# Patient Record
Sex: MALE | Race: WHITE | NOT HISPANIC OR LATINO | Employment: FULL TIME | URBAN - NONMETROPOLITAN AREA
[De-identification: names, ages, dates, MRNs, and addresses within clinical notes are randomized per-mention and may not be internally consistent; named-entity substitution may affect disease eponyms.]

---
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
## 2020-03-11 ENCOUNTER — HEALTH MAINTENANCE LETTER (OUTPATIENT)
Age: 20
End: 2020-03-11

## 2020-09-17 ENCOUNTER — OFFICE VISIT (OUTPATIENT)
Dept: FAMILY MEDICINE | Facility: OTHER | Age: 20
End: 2020-09-17
Attending: PHYSICIAN ASSISTANT

## 2020-09-17 VITALS
WEIGHT: 220 LBS | RESPIRATION RATE: 20 BRPM | BODY MASS INDEX: 30.68 KG/M2 | SYSTOLIC BLOOD PRESSURE: 130 MMHG | TEMPERATURE: 97.5 F | HEART RATE: 60 BPM | DIASTOLIC BLOOD PRESSURE: 80 MMHG

## 2020-09-17 DIAGNOSIS — R41.840 DECREASED ATTENTION SPAN: Primary | ICD-10-CM

## 2020-09-17 DIAGNOSIS — R41.840 CONCENTRATION DEFICIT: ICD-10-CM

## 2020-09-17 PROCEDURE — 99213 OFFICE O/P EST LOW 20 MIN: CPT | Performed by: PHYSICIAN ASSISTANT

## 2020-09-17 SDOH — HEALTH STABILITY: MENTAL HEALTH: HOW OFTEN DO YOU HAVE A DRINK CONTAINING ALCOHOL?: NEVER

## 2020-09-17 NOTE — PROGRESS NOTES
Nursing Notes:   No Marino LPN  9/17/2020  1:32 PM  Signed  Chief Complaint   Patient presents with     A.COLTONHCHAPO         Medication Reconciliation: complete    No Marino LPN      HPI:    Julius An is a 20 year old male who presents for ADHD concerns.  Patient states that his [employer] at work has noticed concerns about his attention and focus.  They are wondering if he has ADD.  He gets off of tasks easily.  He has hard time focusing and staying on task.  He has had the symptoms over the last 2 to 3 years.  Father may have had ADHD.  Unsure if he was previously tested.  No hyperactivity concerns.  No depression or anxiety concerns.  No suicidal homicidal ideation.    Past Medical History:   Diagnosis Date     Condition influencing health status     No Comments Provided       Past Surgical History:   Procedure Laterality Date     OTHER SURGICAL HISTORY      [id number],NO PREVIOUS SURGERY,Past Surgical History is unremarkable       History reviewed. No pertinent family history.    Social History     Tobacco Use     Smoking status: Current Every Day Smoker     Packs/day: 0.50     Types: Cigarettes     Start date: 05/2018     Smokeless tobacco: Never Used   Substance Use Topics     Alcohol use: No     Frequency: Never       No current outpatient medications on file.       No Known Allergies    REVIEW OFSYSTEMS:  Refer to HPI.    EXAM:   Vitals:    /80 (BP Location: Right arm, Patient Position: Sitting, Cuff Size: Adult Regular)   Pulse 60   Temp 97.5  F (36.4  C)   Resp 20   Wt 99.8 kg (220 lb)   BMI 30.68 kg/m    General appearance:appropriately dressed and well groomed  Attitude: cooperative  Behavior: normal  Eye Contact: normal  Speech: normal  Orientation: oriented to person, place, time and situation  Mood:  admits no sadness and anxiety  Affect: Mood Congruent  Thought Process: clear  Suicidal or Homicidal Ideation: reports no thoughts or intentions  Hallucination:  no    PHQ Depression Screen  No flowsheet data found.    CLYDE Anxiety Screen  No flowsheet data found.    ASSESSMENT AND PLAN:      ICD-10-CM    1. Decreased attention Span  R41.840 MENTAL HEALTH REFERRAL  - Adult; Assessments and Testing; ADHD; Other[facility] [phone]; We will contact you to schedule the appointment or please call with any questions   2. Concentration deficit  R41.840 MENTAL HEALTH REFERRAL  - Adult; Assessments and Testing; ADHD; Other: [facility] [phone]; We will contact you to schedule the appointment or please call with any questions         Completed mental health referral for ADHD testing.  Discussed symptoms at length.  Return as needed for recheck.  Discussed medication options depending on the results of the ADHD testing.    Reviewed risks and benefits of medications and other treatment options.   Pt is advised to call if side effects to medications occur, especially if exaggerated/dramatic.    Maddy Alvarez PA-C PA-C..................9/17/2020 1:21 PM

## 2020-09-17 NOTE — NURSING NOTE
Chief Complaint   Patient presents with     A.D.H.D         Medication Reconciliation: complete    No Marnio LPN

## 2020-10-18 ENCOUNTER — HOSPITAL ENCOUNTER (EMERGENCY)
Facility: OTHER | Age: 20
Discharge: HOME | End: 2020-10-18
Attending: FAMILY MEDICINE | Admitting: FAMILY MEDICINE

## 2020-10-18 VITALS
WEIGHT: 230 LBS | SYSTOLIC BLOOD PRESSURE: 122 MMHG | HEART RATE: 55 BPM | TEMPERATURE: 98.4 F | DIASTOLIC BLOOD PRESSURE: 76 MMHG | OXYGEN SATURATION: 82 % | RESPIRATION RATE: 18 BRPM | BODY MASS INDEX: 32.08 KG/M2

## 2020-10-18 DIAGNOSIS — L23.7 CONTACT DERMATITIS DUE TO POISON IVY: ICD-10-CM

## 2020-10-18 DIAGNOSIS — L03.119 CELLULITIS OF UPPER EXTREMITY, UNSPECIFIED LATERALITY: ICD-10-CM

## 2020-10-18 DIAGNOSIS — Z20.2 STD EXPOSURE: ICD-10-CM

## 2020-10-18 PROCEDURE — 250N000013 HC RX MED GY IP 250 OP 250 PS 637: Performed by: FAMILY MEDICINE

## 2020-10-18 PROCEDURE — 96372 THER/PROPH/DIAG INJ SC/IM: CPT | Performed by: FAMILY MEDICINE

## 2020-10-18 PROCEDURE — 87491 CHLMYD TRACH DNA AMP PROBE: CPT | Performed by: FAMILY MEDICINE

## 2020-10-18 PROCEDURE — 250N000011 HC RX IP 250 OP 636: Performed by: FAMILY MEDICINE

## 2020-10-18 PROCEDURE — 99283 EMERGENCY DEPT VISIT LOW MDM: CPT | Performed by: FAMILY MEDICINE

## 2020-10-18 PROCEDURE — 87591 N.GONORRHOEAE DNA AMP PROB: CPT | Performed by: FAMILY MEDICINE

## 2020-10-18 PROCEDURE — 99284 EMERGENCY DEPT VISIT MOD MDM: CPT | Performed by: FAMILY MEDICINE

## 2020-10-18 PROCEDURE — 81003 URINALYSIS AUTO W/O SCOPE: CPT | Performed by: FAMILY MEDICINE

## 2020-10-18 RX ORDER — CEFTRIAXONE SODIUM 1 G
1 VIAL (EA) INJECTION ONCE
Status: COMPLETED | OUTPATIENT
Start: 2020-10-18 | End: 2020-10-18

## 2020-10-18 RX ORDER — DEXAMETHASONE SODIUM PHOSPHATE 10 MG/ML
10 INJECTION, SOLUTION INTRAMUSCULAR; INTRAVENOUS ONCE
Status: COMPLETED | OUTPATIENT
Start: 2020-10-18 | End: 2020-10-18

## 2020-10-18 RX ORDER — AZITHROMYCIN 250 MG/1
1000 TABLET, FILM COATED ORAL ONCE
Status: COMPLETED | OUTPATIENT
Start: 2020-10-18 | End: 2020-10-18

## 2020-10-18 RX ORDER — CEPHALEXIN 500 MG/1
500 CAPSULE ORAL 4 TIMES DAILY
Qty: 40 CAPSULE | Refills: 0 | Status: SHIPPED | OUTPATIENT
Start: 2020-10-18 | End: 2020-10-28

## 2020-10-18 RX ORDER — PREDNISONE 20 MG/1
60 TABLET ORAL DAILY
Qty: 12 TABLET | Refills: 0 | Status: SHIPPED | OUTPATIENT
Start: 2020-10-18 | End: 2020-10-18

## 2020-10-18 RX ORDER — PREDNISONE 20 MG/1
60 TABLET ORAL DAILY
Qty: 12 TABLET | Refills: 0 | Status: SHIPPED | OUTPATIENT
Start: 2020-10-18

## 2020-10-18 RX ADMIN — AZITHROMYCIN MONOHYDRATE 1000 MG: 250 TABLET ORAL at 22:30

## 2020-10-18 RX ADMIN — CEFTRIAXONE SODIUM 1 G: 1 INJECTION, POWDER, FOR SOLUTION INTRAMUSCULAR; INTRAVENOUS at 22:30

## 2020-10-18 RX ADMIN — DEXAMETHASONE SODIUM PHOSPHATE 10 MG: 10 INJECTION INTRAMUSCULAR; INTRAVENOUS at 22:29

## 2020-10-18 ASSESSMENT — ENCOUNTER SYMPTOMS
DIARRHEA: 0
SORE THROAT: 0
COUGH: 0
MYALGIAS: 0
DYSURIA: 0
WEAKNESS: 0
BACK PAIN: 0
VOMITING: 0
FLANK PAIN: 0
NAUSEA: 0
SHORTNESS OF BREATH: 0
EYE ITCHING: 0
FEVER: 0
ABDOMINAL PAIN: 0

## 2020-10-18 NOTE — ED AVS SNAPSHOT
[facility]  [address]  [place] [address]  Phone: [phone]  Fax: [phone]                                    Julius An   MRN: [id number]    Department: [facility]   Date of Visit: 10/18/2020           After Visit Summary Signature Page    I have received my discharge instructions, and my questions have been answered. I have discussed any challenges I see with this plan with the nurse or doctor.    ..........................................................................................................................................  Patient/Patient Representative Signature      ..........................................................................................................................................  Patient Representative Print Name and Relationship to Patient    ..................................................               ................................................  Date                                   Time    ..........................................................................................................................................  Reviewed by Signature/Title    ...................................................              ..............................................  Date                                               Time          22EPIC Rev 08/18

## 2020-10-19 LAB
ALBUMIN UR-MCNC: NEGATIVE MG/DL
APPEARANCE UR: CLEAR
BILIRUB UR QL STRIP: NEGATIVE
C TRACH DNA SPEC QL NAA+PROBE: NOT DETECTED
COLOR UR AUTO: NORMAL
GLUCOSE UR STRIP-MCNC: NEGATIVE MG/DL
HGB UR QL STRIP: NEGATIVE
KETONES UR STRIP-MCNC: NEGATIVE MG/DL
LEUKOCYTE ESTERASE UR QL STRIP: NEGATIVE
N GONORRHOEA DNA SPEC QL NAA+PROBE: NOT DETECTED
NITRATE UR QL: NEGATIVE
PH UR STRIP: 6.5 PH (ref 5–7)
SOURCE: NORMAL
SP GR UR STRIP: 1.02 (ref 1–1.03)
SPECIMEN SOURCE: NORMAL
UROBILINOGEN UR STRIP-MCNC: NORMAL MG/DL (ref 0–2)

## 2020-10-19 NOTE — ED NOTES
Pt feeling jittery after IM steroid and antibiotic. Pt stated he felt faint in bathroom. P-63. SP02 100% on R/A R-20. B/P 129/93. Jael Clemente RN .............. 10/18/2020  10:43 PM

## 2020-10-19 NOTE — ED TRIAGE NOTES
Pt presents with a rash on both forearms that started on Wednesday and has gradually been getting worse (covering most of posterior forearms). Rash is itchy and blistering, crusted, some swelling. Pain rated at 91/0. Pt stated in the past he had a small spot but it cleared up with creme that was prescription. Denies SOB. /64   Pulse 94   Temp 98.4  F (36.9  C)   Resp 18   Wt 104.3 kg (230 lb)   SpO2 99%   BMI 32.08 kg/m  '  Peggy Maier RN on 10/18/2020 at 9:29 PM

## 2020-10-19 NOTE — ED PROVIDER NOTES
History     Chief Complaint   Patient presents with     Rash     HPI  Julius An is a 20 year old male who presents to the emergency room complaining of a rash on both of his forearms.  He states the rash started approximately 1 week ago and has progressively worsened.  He states the rash is itchy and has some vesicles present that he scratched which made the rash spread.  He states now there is some crusting that is sort of yellowish.  He states that the crusting started a couple of days ago.  He states that his forearms feel swollen and are now starting to hurt.  He has not taken any ibuprofen or Tylenol.  He states he had a similar rash about a year ago and used an over-the-counter cream topically which made it go away but it did not work this time.  He states that he does work outside and may have poison ivy.    No fever, chills, sore throat, cough, chest pain, shortness of breath, abdominal pain, nausea, vomiting, diarrhea, dysuria, back or flank pain.  The patient states that his girlfriend told him that she had chlamydia and he would like to be tested and treated today.  He denies any penile discharge or penile lesions.    Allergies:  No Known Allergies    Problem List:    There are no active problems to display for this patient.       Past Medical History:    Past Medical History:   Diagnosis Date     Condition influencing health status        Past Surgical History:    Past Surgical History:   Procedure Laterality Date     OTHER SURGICAL HISTORY      [id number],NO PREVIOUS SURGERY,Past Surgical History is unremarkable       Family History:    No family history on file.    Social History:  Marital Status:  Single [1]  Social History     Tobacco Use     Smoking status: Current Every Day Smoker     Packs/day: 0.50     Types: Cigarettes     Start date: 05/2018     Smokeless tobacco: Never Used   Substance Use Topics     Alcohol use: No     Frequency: Never     Drug use: Never     Comment: Drug use: No         Medications:         cephALEXin (KEFLEX) 500 MG capsule       predniSONE (DELTASONE) 20 MG tablet          Review of Systems   Constitutional: Negative for fever.   HENT: Negative for sore throat.    Eyes: Negative for itching.   Respiratory: Negative for cough and shortness of breath.    Cardiovascular: Negative for chest pain.   Gastrointestinal: Negative for abdominal pain, diarrhea, nausea and vomiting.   Genitourinary: Negative for dysuria, flank pain, penile pain, penile swelling, scrotal swelling and testicular pain.   Musculoskeletal: Negative for back pain and myalgias.   Skin: Positive for rash.   Neurological: Negative for weakness.   All other systems reviewed and are negative.      Physical Exam   BP: 138/64  Pulse: 94  Temp: 98.4  F (36.9  C)  Resp: 18  Weight: 104.3 kg (230 lb)  SpO2: 99 %      Physical Exam  Vitals signs and nursing note reviewed.   Constitutional:       General: He is not in acute distress.     Appearance: Normal appearance. He is well-developed and overweight. He is not ill-appearing.   HENT:      Head: Normocephalic and atraumatic.      Right Ear: External ear normal.      Left Ear: External ear normal.      Nose: Nose normal.      Mouth/Throat:      Lips: Pink.      Mouth: Mucous membranes are moist.      Pharynx: Oropharynx is clear.   Eyes:      Extraocular Movements: Extraocular movements intact.      Conjunctiva/sclera: Conjunctivae normal.      Pupils: Pupils are equal, round, and reactive to light.   Neck:      Musculoskeletal: Full passive range of motion without pain, normal range of motion and neck supple.      Thyroid: No thyromegaly.   Cardiovascular:      Rate and Rhythm: Normal rate and regular rhythm.      Pulses: Normal pulses.      Heart sounds: Normal heart sounds, S1 normal and S2 normal. No murmur.   Pulmonary:      Effort: Pulmonary effort is normal.      Breath sounds: Normal breath sounds. No decreased breath sounds, wheezing, rhonchi or rales.    Abdominal:      General: Bowel sounds are normal.      Palpations: Abdomen is soft.      Tenderness: There is no abdominal tenderness.   Musculoskeletal: Normal range of motion.   Lymphadenopathy:      Cervical: No cervical adenopathy.   Skin:     General: Skin is warm.      Capillary Refill: Capillary refill takes less than 2 seconds.      Findings: Erythema and rash present. Rash is crusting and vesicular.      Comments: There is a an underlying rash present on both forearms and wrist that is erythematous with excoriated vesicles present.  There is crusting over some areas and underlying warmth, swelling and tenderness.  Findings are consistent with poison ivy that has become secondarily infected.   Neurological:      Mental Status: He is alert and oriented to person, place, and time.   Psychiatric:         Mood and Affect: Mood normal.         Behavior: Behavior normal. Behavior is cooperative.         ED Course     Procedures  Critical Care time:  none  No results found for this or any previous visit (from the past 24 hour(s)).    Medications   dexamethasone PF (DECADRON) injection 10 mg (10 mg Intramuscular Given 10/18/20 2229)   cefTRIAXone (ROCEPHIN) injection 1 g (1 g Intramuscular Given 10/18/20 2230)   azithromycin (ZITHROMAX) tablet 1,000 mg (1,000 mg Oral Given 10/18/20 2230)       Assessments & Plan (with Medical Decision Making)     I have reviewed the nursing notes.    The patient is treated empirically with Rocephin and Zithromax to treat both the STD exposure as well as his infected dermatitis.    Patient is discharged home in good condition.  He will be continued on prednisone 60 mg daily for 4 more days and Keflex for 10 days.  He may use ibuprofen and Tylenol as needed for discomfort.  I have reviewed the findings, diagnosis, plan and need for follow up with the patient.    New Prescriptions    CEPHALEXIN (KEFLEX) 500 MG CAPSULE    Take 1 capsule (500 mg) by mouth 4 times daily for 10 days     PREDNISONE (DELTASONE) 20 MG TABLET    Take 3 tablets (60 mg) by mouth daily       Final diagnoses:   Contact dermatitis due to poison ivy   STD exposure   Cellulitis of upper extremity, unspecified laterality       10/18/2020   [facility]     Severo Alfaro MD  10/18/20 9233

## 2020-10-19 NOTE — RESULT ENCOUNTER NOTE
Final result for both N. Gonorrhoeae PCR and Chlamydia Trachomatis PCR are NEGATIVE.  No treatment or change in treatment per [facility] ED Lab Result protocol.

## 2020-11-09 ENCOUNTER — ALLIED HEALTH/NURSE VISIT (OUTPATIENT)
Dept: FAMILY MEDICINE | Facility: OTHER | Age: 20
End: 2020-11-09
Payer: OTHER GOVERNMENT

## 2020-11-09 DIAGNOSIS — R05.9 COUGH: Primary | ICD-10-CM

## 2020-11-09 PROCEDURE — 99207 PR NO CHARGE NURSE ONLY: CPT

## 2020-11-09 PROCEDURE — U0003 INFECTIOUS AGENT DETECTION BY NUCLEIC ACID (DNA OR RNA); SEVERE ACUTE RESPIRATORY SYNDROME CORONAVIRUS 2 (SARS-COV-2) (CORONAVIRUS DISEASE [COVID-19]), AMPLIFIED PROBE TECHNIQUE, MAKING USE OF HIGH THROUGHPUT TECHNOLOGIES AS DESCRIBED BY CMS-2020-01-R: HCPCS | Mod: ZL

## 2020-11-09 PROCEDURE — C9803 HOPD COVID-19 SPEC COLLECT: HCPCS

## 2020-11-10 LAB
SARS-COV-2 RNA SPEC QL NAA+PROBE: ABNORMAL
SPECIMEN SOURCE: ABNORMAL

## 2024-12-16 ENCOUNTER — HOSPITAL ENCOUNTER (EMERGENCY)
Facility: OTHER | Age: 24
Discharge: HOME | End: 2024-12-16
Attending: PHYSICIAN ASSISTANT
Payer: MEDICAID

## 2024-12-16 VITALS
OXYGEN SATURATION: 99 % | WEIGHT: 230 LBS | HEIGHT: 71 IN | BODY MASS INDEX: 32.2 KG/M2 | RESPIRATION RATE: 18 BRPM | SYSTOLIC BLOOD PRESSURE: 145 MMHG | HEART RATE: 101 BPM | DIASTOLIC BLOOD PRESSURE: 98 MMHG | TEMPERATURE: 97.8 F

## 2024-12-16 DIAGNOSIS — R42 LIGHTHEADEDNESS: ICD-10-CM

## 2024-12-16 PROCEDURE — 99282 EMERGENCY DEPT VISIT SF MDM: CPT | Performed by: PHYSICIAN ASSISTANT

## 2024-12-16 PROCEDURE — 99281 EMR DPT VST MAYX REQ PHY/QHP: CPT | Performed by: PHYSICIAN ASSISTANT

## 2024-12-16 ASSESSMENT — COLUMBIA-SUICIDE SEVERITY RATING SCALE - C-SSRS
1. IN THE PAST MONTH, HAVE YOU WISHED YOU WERE DEAD OR WISHED YOU COULD GO TO SLEEP AND NOT WAKE UP?: NO
6. HAVE YOU EVER DONE ANYTHING, STARTED TO DO ANYTHING, OR PREPARED TO DO ANYTHING TO END YOUR LIFE?: NO
2. HAVE YOU ACTUALLY HAD ANY THOUGHTS OF KILLING YOURSELF IN THE PAST MONTH?: NO

## 2024-12-16 NOTE — ED PROVIDER NOTES
"  History     Chief Complaint   Patient presents with     Shoulder Pain     HPI  Julius An is a 24 year old male who presents to the ED for evaluation of shoulder pain. Pt c/o 1 week of bilateral shoulder pain worse in the L side. Pt reports he thought that this was because he has been snowmobiling lately. A few days ago pt noticed that there were linear scars on both shoulders, he reports the L shoulder looks more \"fresh\". Pt also has document from dentist that he believes states he has a \"blood pressure implant\".    Allergies:  No Known Allergies    Problem List:    There are no active problems to display for this patient.       Past Medical History:    Past Medical History:   Diagnosis Date     Condition influencing health status        Past Surgical History:    Past Surgical History:   Procedure Laterality Date     OTHER SURGICAL HISTORY      [id number],NO PREVIOUS SURGERY,Past Surgical History is unremarkable       Family History:    No family history on file.    Social History:  Marital Status:  Single [1]  Social History     Tobacco Use     Smoking status: Every Day     Current packs/day: 0.50     Average packs/day: 0.5 packs/day for 6.6 years (3.3 ttl pk-yrs)     Types: Cigarettes     Start date: 05/2018     Smokeless tobacco: Never   Substance Use Topics     Alcohol use: No     Drug use: Never     Comment: Drug use: No        Medications:    predniSONE (DELTASONE) 20 MG tablet          Review of Systems    Physical Exam   BP: (!) 145/98  Pulse: 101  Temp: 97.8  F (36.6  C)  Resp: 18  Height: 180.3 cm (5' 11\")  Weight: 104.3 kg (230 lb)  SpO2: 99 %      Physical Exam    ED Course        Procedures         {EKG done?:485227}    Critical Care time:  {none or minutes:404548}  {Trauma Activation or Fall?:837082}  {Sepsis/Septic Shock/Stemi/Stroke:188650}         No results found for this or any previous visit (from the past 24 hours).    Medications - No data to display    Assessments & Plan (with Medical " "Decision Making)     I have reviewed the nursing notes.    I have reviewed the findings, diagnosis, plan and need for follow up with the patient.  {ED Addendum:277293::\" \"}        Medical Decision Making  The patient's presentation was of {[facility] Problem:366184}.    The patient's evaluation involved:  {[facility] Data:273705}    The patient's management necessitated {[facility] Management:597590}.        Current Discharge Medication List          Final diagnoses:   Lightheadedness       12/16/2024   [facility]    " for follow up with the patient.        Discharge Medication List as of 12/16/2024  4:49 PM          Final diagnoses:   Lightheadedness       12/16/2024   [facility] AND [facility]       Devon Quiles PA  12/21/24 0808

## 2024-12-16 NOTE — ED TRIAGE NOTES
"Pt arrives to ED alone via private vehicle. Pt c/o 1 week of bilateral shoulder pain worse in the L side. Pt reports he thought that this was because he has been snowmobiling lately. A few days ago pt noticed that there were linear scars on both shoulders, he reports the L shoulder looks more \"fresh\". Pt also has document from dentist that he believes states he has a \"blood pressure implant\" writer cannot find any mention of implant in documents provided by pt. Pulse 101   Temp 97.8  F (36.6  C) (Tympanic)   Resp 18   Ht 1.803 m (5' 11\")   Wt 104.3 kg (230 lb)   SpO2 99%   BMI 32.08 kg/m         Triage Assessment (Adult)       Row Name 12/16/24 9702          Triage Assessment    Airway WDL WDL        Respiratory WDL    Respiratory WDL WDL        Skin Circulation/Temperature WDL    Skin Circulation/Temperature WDL X  pt reports incisions on both shoulders        Cardiac WDL    Cardiac WDL WDL        Peripheral/Neurovascular WDL    Peripheral Neurovascular WDL WDL        Cognitive/Neuro/Behavioral WDL    Cognitive/Neuro/Behavioral WDL WDL                     "

## 2024-12-16 NOTE — ED NOTES
"Pt states that he is here because the dentist took his blood pressure on the left arm and he is concerned about a line across his left shoulder. He states there is a line on his shoulder like an \"incision\". No line visualized on shoulder. Back is unremarkable.   "

## 2024-12-16 NOTE — DISCHARGE INSTRUCTIONS
As discussed, physical exam appeared well.  Did discuss we did discuss obtaining further studies today and continue with follow-up appointment with PCP.  He did decline at this time.  Please return if there are worsening or concerning symptoms.

## 2024-12-21 ASSESSMENT — ENCOUNTER SYMPTOMS
FEVER: 0
SHORTNESS OF BREATH: 0
COUGH: 0
CHILLS: 0
LIGHT-HEADEDNESS: 1
ABDOMINAL PAIN: 0

## 2024-12-22 ENCOUNTER — HOSPITAL ENCOUNTER (EMERGENCY)
Facility: OTHER | Age: 24
Discharge: HOME | End: 2024-12-22
Attending: STUDENT IN AN ORGANIZED HEALTH CARE EDUCATION/TRAINING PROGRAM
Payer: MEDICAID

## 2024-12-22 VITALS
HEART RATE: 105 BPM | DIASTOLIC BLOOD PRESSURE: 89 MMHG | OXYGEN SATURATION: 100 % | RESPIRATION RATE: 24 BRPM | TEMPERATURE: 100.2 F | SYSTOLIC BLOOD PRESSURE: 132 MMHG

## 2024-12-22 DIAGNOSIS — K08.89 PAIN, DENTAL: ICD-10-CM

## 2024-12-22 DIAGNOSIS — J10.1 INFLUENZA A: ICD-10-CM

## 2024-12-22 LAB
BASOPHILS # BLD AUTO: 0 10E3/UL (ref 0–0.2)
BASOPHILS NFR BLD AUTO: 0 %
EOSINOPHIL # BLD AUTO: 0.1 10E3/UL (ref 0–0.7)
EOSINOPHIL NFR BLD AUTO: 1 %
ERYTHROCYTE [DISTWIDTH] IN BLOOD BY AUTOMATED COUNT: 12.6 % (ref 10–15)
FLUAV RNA SPEC QL NAA+PROBE: POSITIVE
FLUBV RNA RESP QL NAA+PROBE: NEGATIVE
HCT VFR BLD AUTO: 43.3 % (ref 40–53)
HGB BLD-MCNC: 15.1 G/DL (ref 13.3–17.7)
HOLD SPECIMEN: NORMAL
IMM GRANULOCYTES # BLD: 0 10E3/UL
IMM GRANULOCYTES NFR BLD: 0 %
LYMPHOCYTES # BLD AUTO: 1.7 10E3/UL (ref 0.8–5.3)
LYMPHOCYTES NFR BLD AUTO: 17 %
MCH RBC QN AUTO: 29.2 PG (ref 26.5–33)
MCHC RBC AUTO-ENTMCNC: 34.9 G/DL (ref 31.5–36.5)
MCV RBC AUTO: 84 FL (ref 78–100)
MONOCYTES # BLD AUTO: 1.6 10E3/UL (ref 0–1.3)
MONOCYTES NFR BLD AUTO: 16 %
NEUTROPHILS # BLD AUTO: 6.7 10E3/UL (ref 1.6–8.3)
NEUTROPHILS NFR BLD AUTO: 66 %
NRBC # BLD AUTO: 0 10E3/UL
NRBC BLD AUTO-RTO: 0 /100
PLATELET # BLD AUTO: 300 10E3/UL (ref 150–450)
RBC # BLD AUTO: 5.18 10E6/UL (ref 4.4–5.9)
RSV RNA SPEC NAA+PROBE: NEGATIVE
SARS-COV-2 RNA RESP QL NAA+PROBE: NEGATIVE
WBC # BLD AUTO: 10.2 10E3/UL (ref 4–11)

## 2024-12-22 PROCEDURE — 99284 EMERGENCY DEPT VISIT MOD MDM: CPT | Performed by: STUDENT IN AN ORGANIZED HEALTH CARE EDUCATION/TRAINING PROGRAM

## 2024-12-22 PROCEDURE — 87637 SARSCOV2&INF A&B&RSV AMP PRB: CPT | Performed by: STUDENT IN AN ORGANIZED HEALTH CARE EDUCATION/TRAINING PROGRAM

## 2024-12-22 PROCEDURE — 250N000011 HC RX IP 250 OP 636: Performed by: STUDENT IN AN ORGANIZED HEALTH CARE EDUCATION/TRAINING PROGRAM

## 2024-12-22 PROCEDURE — 96372 THER/PROPH/DIAG INJ SC/IM: CPT | Performed by: STUDENT IN AN ORGANIZED HEALTH CARE EDUCATION/TRAINING PROGRAM

## 2024-12-22 PROCEDURE — 85025 COMPLETE CBC W/AUTO DIFF WBC: CPT | Performed by: STUDENT IN AN ORGANIZED HEALTH CARE EDUCATION/TRAINING PROGRAM

## 2024-12-22 PROCEDURE — 36415 COLL VENOUS BLD VENIPUNCTURE: CPT | Performed by: STUDENT IN AN ORGANIZED HEALTH CARE EDUCATION/TRAINING PROGRAM

## 2024-12-22 PROCEDURE — 99283 EMERGENCY DEPT VISIT LOW MDM: CPT | Performed by: STUDENT IN AN ORGANIZED HEALTH CARE EDUCATION/TRAINING PROGRAM

## 2024-12-22 RX ORDER — KETOROLAC TROMETHAMINE 30 MG/ML
30 INJECTION, SOLUTION INTRAMUSCULAR; INTRAVENOUS ONCE
Status: COMPLETED | OUTPATIENT
Start: 2024-12-22 | End: 2024-12-22

## 2024-12-22 RX ADMIN — KETOROLAC TROMETHAMINE 30 MG: 30 INJECTION, SOLUTION INTRAMUSCULAR at 20:07

## 2024-12-22 ASSESSMENT — COLUMBIA-SUICIDE SEVERITY RATING SCALE - C-SSRS
2. HAVE YOU ACTUALLY HAD ANY THOUGHTS OF KILLING YOURSELF IN THE PAST MONTH?: NO
6. HAVE YOU EVER DONE ANYTHING, STARTED TO DO ANYTHING, OR PREPARED TO DO ANYTHING TO END YOUR LIFE?: NO
1. IN THE PAST MONTH, HAVE YOU WISHED YOU WERE DEAD OR WISHED YOU COULD GO TO SLEEP AND NOT WAKE UP?: NO

## 2024-12-22 ASSESSMENT — ACTIVITIES OF DAILY LIVING (ADL): ADLS_ACUITY_SCORE: 41

## 2024-12-23 NOTE — DISCHARGE INSTRUCTIONS
You tested positive for the flu. Expect symptoms to improve after several days. If you reconsider and would like to get the flu antiviral treatment, please call [facility] and ask for the emergency department.    Recommend using ibuprofen and/or Tylenol for fever and body aches. Suspect once you are over the flu your jaw pain will feel better. If your tooth/jaw still hurts after the flu - please follow up with a dentist.    Your blood test was reassuring for unlikely bacterial infection.    Recommend setting up a primary care provider appointment on your way out at the ER registration desk for further follow up.    Please review attached instructions including reasons to return to the emergency department.

## 2024-12-23 NOTE — ED PROVIDER NOTES
History     Chief Complaint   Patient presents with    Fever    Headache    Dental Pain       Julius An is a 24 year old male who presents with viral type symptoms.  Acute onset 3 AM this morning.  He has a fever, headache, fatigue, myalgias, and upper left tooth pain.  He feels there is pain throughout his entire left jaw but particularly to his approximately #15 which is mostly absent but he states still there is a part of it intact.  Drainage from this area.  He is concerned he may have a bacterial infection from contaminated water at home.  Denies any vomiting or diarrhea or dyspnea.     No Known Allergies    There are no active problems to display for this patient.      Past Medical History:   Diagnosis Date    Condition influencing health status        Past Surgical History:   Procedure Laterality Date    OTHER SURGICAL HISTORY      [id number],NO PREVIOUS SURGERY,Past Surgical History is unremarkable       No family history on file.    Social History     Tobacco Use    Smoking status: Every Day     Current packs/day: 0.50     Average packs/day: 0.5 packs/day for 6.6 years (3.3 ttl pk-yrs)     Types: Cigarettes     Start date: 05/2018    Smokeless tobacco: Never   Substance Use Topics    Alcohol use: No    Drug use: Never     Comment: Drug use: No       Medications:    predniSONE (DELTASONE) 20 MG tablet        Review of Systems: See HPI for pertinent negatives and positives. All other systems reviewed and found to be negative.    Physical Exam   /89   Pulse 105   Temp 100.2  F (37.9  C) (Tympanic)   Resp 24   SpO2 100%      General: awake, comfortable  HEENT: atraumatic, absent tooth approximately #15 with no surrounding drainage, swelling or fluctuant area or erythema, uvula midline  Respiratory: normal effort, clear to auscultation bilaterally  Cardiovascular: Tachycardic, regular rhythm, no murmurs  Abdomen: soft, nondistended, nontender  Extremities: no deformities, edema, or tenderness  Skin:  warm, dry, no rashes  Neuro: alert, no focal deficits  Psych: appropriate mood and affect    ED Course      Results for orders placed or performed during the hospital encounter of 12/22/24 (from the past 24 hours)   [place] Draw    Narrative    The following orders were created for panel order [place] Draw.  Procedure                               Abnormality         Status                     ---------                               -----------         ------                     Extra Blue Top Tube[[id number]]                              Final result               Extra Red Top Tube[[id number]]                               Final result               Extra Green Top (Lithium...[[id number]]                      Final result               Extra Purple Top Tube[[id number]]                            Final result               Extra Green Top (Lithium...[[id number]]                      Final result                 Please view results for these tests on the individual orders.   Extra Blue Top Tube   Result Value Ref Range    Hold Specimen JIC    Extra Red Top Tube   Result Value Ref Range    Hold Specimen JIC    Extra Green Top (Lithium Heparin) Tube   Result Value Ref Range    Hold Specimen JIC    Extra Purple Top Tube   Result Value Ref Range    Hold Specimen JIC    Extra Green Top (Lithium Heparin) ON ICE   Result Value Ref Range    Hold Specimen JIC    Influenza A/B, RSV and SARS-CoV2 PCR (COVID-19) Nose    Specimen: Nose; Swab   Result Value Ref Range    Influenza A PCR Positive (A) Negative    Influenza B PCR Negative Negative    RSV PCR Negative Negative    SARS CoV2 PCR Negative Negative    Narrative    Testing was performed using the Xpert Xpress CoV2/Flu/RSV Assay on the Cepheid GeneXpert Instrument. This test should be ordered for the detection of SARS-CoV2, influenza, and RSV viruses in individuals with signs and symptoms of respiratory tract infection. This test is for in vitro diagnostic use under the [place] FDA  for laboratories certified under CLIA to perform high or moderate complexity testing. This test has been [place] FDA cleared. A negative result does not rule out the presence of PCR inhibitors in the specimen or target RNA in concentration below the limit of detection for the assay. If only one viral target is positive but coinfection with multiple targets is suspected, the sample should be re-tested with another FDA cleared, approved, or authorized test, if coninfection would change clinical management. This test was validated by the [facility]. These laboratories are certified under the Clinical Laboratory Improvement Amendments of 1988 (CLIA-88) as qualified to perfom high complexity laboratory testing.   CBC with platelets differential    Narrative    The following orders were created for panel order CBC with platelets differential.  Procedure                               Abnormality         Status                     ---------                               -----------         ------                     CBC with platelets and d...[[id number]]  Abnormal            Final result               RBC and Platelet Morphology[[id number]]                                                   Please view results for these tests on the individual orders.   CBC with platelets and differential   Result Value Ref Range    WBC Count 10.2 4.0 - 11.0 10e3/uL    RBC Count 5.18 4.40 - 5.90 10e6/uL    Hemoglobin 15.1 13.3 - 17.7 g/dL    Hematocrit 43.3 40.0 - 53.0 %    MCV 84 78 - 100 fL    MCH 29.2 26.5 - 33.0 pg    MCHC 34.9 31.5 - 36.5 g/dL    RDW 12.6 10.0 - 15.0 %    Platelet Count 300 150 - 450 10e3/uL    % Neutrophils 66 %    % Lymphocytes 17 %    % Monocytes 16 %    % Eosinophils 1 %    % Basophils 0 %    % Immature Granulocytes 0 %    NRBCs per 100 WBC 0 <1 /100    Absolute Neutrophils 6.7 1.6 - 8.3 10e3/uL    Absolute Lymphocytes 1.7 0.8 - 5.3 10e3/uL    Absolute Monocytes 1.6 (H) 0.0 - 1.3 10e3/uL    Absolute  Eosinophils 0.1 0.0 - 0.7 10e3/uL    Absolute Basophils 0.0 0.0 - 0.2 10e3/uL    Absolute Immature Granulocytes 0.0 <=0.4 10e3/uL    Absolute NRBCs 0.0 10e3/uL   Extra Tube    Narrative    The following orders were created for panel order Extra Tube.  Procedure                               Abnormality         Status                     ---------                               -----------         ------                     Extra Blood Culture Bottle[[id number]]                       Final result                 Please view results for these tests on the individual orders.   Extra Blood Culture Bottle   Result Value Ref Range    Hold Specimen [facility]        Medications   ketorolac (TORADOL) injection 30 mg (30 mg Intramuscular $Given 12/22/24 2007)       Assessments & Plan (with Medical Decision Making)     I have reviewed the nursing notes.          24 year old male evaluated for viral type symptoms and upper jaw and tooth pain staritng at same time today. Influenza A positive. Virals remarkable for mild tachycardia and borderline fever 100.2. Dental exam with missing tooth #15 which is source of pain. No obvious tooth abscess or infection. Suspect dental pain associated with influenza versus acute dental infection. Patient has numerous medical concerns such as cancer and requesting blood sample to look under microscope at home. Recommend close PCP follow up and dental follow up if jaw/tooth pain does improve after influenza recovery. He does not know where his insurance card is and therefore declines tamiflu prescription. Given toradol. Reassurance provided and discharged home with attached instructions on diagnosis provided including ED return precautions.     I have reviewed the findings, diagnosis, plan, and need for any follow up with the patient.    Patient instructions:   You tested positive for the flu. Expect symptoms to improve after several days. If you reconsider and would like to get the flu antiviral  treatment, please call [facility] and ask for the emergency department.    Recommend using ibuprofen and/or Tylenol for fever and body aches. Suspect once you are over the flu your jaw pain will feel better. If your tooth/jaw still hurts after the flu - please follow up with a dentist.    Your blood test was reassuring for unlikely bacterial infection.    Recommend setting up a primary care provider appointment on your way out at the ER registration desk for further follow up.    Please review attached instructions including reasons to return to the emergency department.     Discharge Medication List as of 12/22/2024  8:57 PM          Final diagnoses:   Influenza A   Pain, dental       12/22/2024   [facility] AND [facility]     Chris Marquez MD  12/22/24 5710

## 2024-12-23 NOTE — ED TRIAGE NOTES
"Pt arrives via private car with complaints of fever, headache, weakness, body aches and an abscess tooth. Symptoms started this morning around 0300. Pt states he also had his water tested and it was \"bad\" so he wonders if that is contributing.     Triage Assessment (Adult)       Row Name 12/22/24 1933          Triage Assessment    Airway WDL WDL        Respiratory WDL    Respiratory WDL WDL        Skin Circulation/Temperature WDL    Skin Circulation/Temperature WDL WDL        Cardiac WDL    Cardiac WDL WDL        Peripheral/Neurovascular WDL    Peripheral Neurovascular WDL WDL        Cognitive/Neuro/Behavioral WDL    Cognitive/Neuro/Behavioral WDL WDL                     "

## 2025-05-17 ENCOUNTER — HEALTH MAINTENANCE LETTER (OUTPATIENT)
Age: 25
End: 2025-05-17

## (undated) RX ORDER — CEFTRIAXONE SODIUM 1 G
VIAL (EA) INJECTION
Status: DISPENSED
Start: 2020-10-18

## (undated) RX ORDER — LIDOCAINE HYDROCHLORIDE 10 MG/ML
INJECTION, SOLUTION EPIDURAL; INFILTRATION; INTRACAUDAL; PERINEURAL
Status: DISPENSED
Start: 2020-10-18

## (undated) RX ORDER — IBUPROFEN 400 MG/1
TABLET, FILM COATED ORAL
Status: DISPENSED
Start: 2019-05-14

## (undated) RX ORDER — DEXAMETHASONE SODIUM PHOSPHATE 10 MG/ML
INJECTION, SOLUTION INTRAMUSCULAR; INTRAVENOUS
Status: DISPENSED
Start: 2020-10-18

## (undated) RX ORDER — KETOROLAC TROMETHAMINE 30 MG/ML
INJECTION, SOLUTION INTRAMUSCULAR; INTRAVENOUS
Status: DISPENSED
Start: 2024-12-22

## (undated) RX ORDER — AZITHROMYCIN 250 MG/1
TABLET, FILM COATED ORAL
Status: DISPENSED
Start: 2020-10-18